# Patient Record
Sex: MALE | Race: WHITE | Employment: UNEMPLOYED | URBAN - METROPOLITAN AREA
[De-identification: names, ages, dates, MRNs, and addresses within clinical notes are randomized per-mention and may not be internally consistent; named-entity substitution may affect disease eponyms.]

---

## 2017-12-19 ENCOUNTER — APPOINTMENT (OUTPATIENT)
Dept: CT IMAGING | Facility: CLINIC | Age: 42
End: 2017-12-19
Attending: FAMILY MEDICINE
Payer: MEDICARE

## 2017-12-19 ENCOUNTER — HOSPITAL ENCOUNTER (EMERGENCY)
Facility: CLINIC | Age: 42
Discharge: HOME | End: 2017-12-19
Attending: FAMILY MEDICINE | Admitting: FAMILY MEDICINE
Payer: MEDICARE

## 2017-12-19 VITALS
RESPIRATION RATE: 16 BRPM | OXYGEN SATURATION: 95 % | SYSTOLIC BLOOD PRESSURE: 132 MMHG | DIASTOLIC BLOOD PRESSURE: 90 MMHG | TEMPERATURE: 98.5 F

## 2017-12-19 DIAGNOSIS — F41.0 ANXIETY ATTACK: ICD-10-CM

## 2017-12-19 DIAGNOSIS — R41.89 SPELL OF ALTERED COGNITION: ICD-10-CM

## 2017-12-19 LAB
ALBUMIN SERPL-MCNC: 3.8 G/DL (ref 3.4–5)
ALP SERPL-CCNC: 93 U/L (ref 40–150)
ALT SERPL W P-5'-P-CCNC: 18 U/L (ref 0–70)
ANION GAP SERPL CALCULATED.3IONS-SCNC: 8 MMOL/L (ref 3–14)
AST SERPL W P-5'-P-CCNC: 11 U/L (ref 0–45)
BASOPHILS # BLD AUTO: 0 10E9/L (ref 0–0.2)
BASOPHILS NFR BLD AUTO: 0.3 %
BILIRUB SERPL-MCNC: 0.4 MG/DL (ref 0.2–1.3)
BUN SERPL-MCNC: 13 MG/DL (ref 7–30)
CALCIUM SERPL-MCNC: 9.3 MG/DL (ref 8.5–10.1)
CHLORIDE SERPL-SCNC: 108 MMOL/L (ref 94–109)
CO2 SERPL-SCNC: 25 MMOL/L (ref 20–32)
CREAT SERPL-MCNC: 0.47 MG/DL (ref 0.66–1.25)
DIFFERENTIAL METHOD BLD: ABNORMAL
EOSINOPHIL # BLD AUTO: 0.1 10E9/L (ref 0–0.7)
EOSINOPHIL NFR BLD AUTO: 0.8 %
ERYTHROCYTE [DISTWIDTH] IN BLOOD BY AUTOMATED COUNT: 13.2 % (ref 10–15)
GFR SERPL CREATININE-BSD FRML MDRD: >90 ML/MIN/1.7M2
GLUCOSE SERPL-MCNC: 90 MG/DL (ref 70–99)
HCT VFR BLD AUTO: 39.3 % (ref 40–53)
HGB BLD-MCNC: 13 G/DL (ref 13.3–17.7)
IMM GRANULOCYTES # BLD: 0 10E9/L (ref 0–0.4)
IMM GRANULOCYTES NFR BLD: 0.1 %
LYMPHOCYTES # BLD AUTO: 0.9 10E9/L (ref 0.8–5.3)
LYMPHOCYTES NFR BLD AUTO: 11.8 %
MCH RBC QN AUTO: 26.6 PG (ref 26.5–33)
MCHC RBC AUTO-ENTMCNC: 33.1 G/DL (ref 31.5–36.5)
MCV RBC AUTO: 81 FL (ref 78–100)
MONOCYTES # BLD AUTO: 0.4 10E9/L (ref 0–1.3)
MONOCYTES NFR BLD AUTO: 4.4 %
NEUTROPHILS # BLD AUTO: 6.6 10E9/L (ref 1.6–8.3)
NEUTROPHILS NFR BLD AUTO: 82.6 %
NRBC # BLD AUTO: 0 10*3/UL
NRBC BLD AUTO-RTO: 0 /100
PLATELET # BLD AUTO: 268 10E9/L (ref 150–450)
POTASSIUM SERPL-SCNC: 3.7 MMOL/L (ref 3.4–5.3)
PROT SERPL-MCNC: 8.2 G/DL (ref 6.8–8.8)
RBC # BLD AUTO: 4.88 10E12/L (ref 4.4–5.9)
SODIUM SERPL-SCNC: 141 MMOL/L (ref 133–144)
TSH SERPL DL<=0.005 MIU/L-ACNC: 1.64 MU/L (ref 0.4–4)
WBC # BLD AUTO: 8 10E9/L (ref 4–11)

## 2017-12-19 PROCEDURE — 84443 ASSAY THYROID STIM HORMONE: CPT | Performed by: FAMILY MEDICINE

## 2017-12-19 PROCEDURE — 85025 COMPLETE CBC W/AUTO DIFF WBC: CPT | Performed by: FAMILY MEDICINE

## 2017-12-19 PROCEDURE — 80053 COMPREHEN METABOLIC PANEL: CPT | Performed by: FAMILY MEDICINE

## 2017-12-19 PROCEDURE — 93010 ELECTROCARDIOGRAM REPORT: CPT | Mod: Z6 | Performed by: FAMILY MEDICINE

## 2017-12-19 PROCEDURE — 93005 ELECTROCARDIOGRAM TRACING: CPT | Performed by: FAMILY MEDICINE

## 2017-12-19 PROCEDURE — 99285 EMERGENCY DEPT VISIT HI MDM: CPT | Mod: 25 | Performed by: FAMILY MEDICINE

## 2017-12-19 PROCEDURE — 99284 EMERGENCY DEPT VISIT MOD MDM: CPT | Mod: 25 | Performed by: FAMILY MEDICINE

## 2017-12-19 PROCEDURE — 70450 CT HEAD/BRAIN W/O DYE: CPT

## 2017-12-19 NOTE — DISCHARGE INSTRUCTIONS
Thank you for choosing [facility].     Please closely monitor for further symptoms. Return to the Emergency Department if you develop any new or worsening signs or symptoms.    If you received any opiate pain medications or sedatives during your visit, please do not drive for at least 8 hours.     Labs, cultures or final xray interpretations may still need to be reviewed.  We will call you if your plan of care needs to be changed.    Please follow up with your primary care physician or clinic.

## 2017-12-19 NOTE — ED NOTES
Bed: ED08  Expected date:   Expected time:   Means of arrival:   Comments:  Neel 635  42 yr. R/o seizure, confused

## 2017-12-19 NOTE — ED PROVIDER NOTES
"  History     Chief Complaint   Patient presents with     Altered Mental Status     increased confusion, poss. seizure at home     HPI  Oseas Carrillo is a 42 year old male transgender female to male patient, who presents due to a reported episode of altered mental status.  History is taken from the EMS paramedic who obtained some of the history from the patient's staff.  This patient has a history of mental health disorder and cognitive disability and has 24-hour staff at his home.  Per the report he seemed \"a little confused\" this morning.  At one point he was closing his eyes and would not talk to the staff.  He later apparently fell backward onto a couch and may have done some myoclonic jerking.  In speaking with the staff, they report that he was \"obsessing\" all night about some pending medical appointments.  He has a history of becoming extremely anxious when he has medical appointments.  At one point he was hyperventilating and this preceded his episode in which he fell back onto the couch.  No definite epileptiform activity.  He also seemed to be making some statements that did not make sense and saying words that did not go together.  That resolved completely after about 30 seconds.  The patient states he does not recall this event.  He currently has no symptoms.  Denies headache, denies any neurologic symptoms.  He states he believes he may have had a seizure due to his medications.  Medications have not been changed recently.  Per review of the chart from his most recent psychiatric admission the patient has a tendency to exhibit somatoform symptoms (\"The patient does reveal attention seeking, and has a tendency to focus on a multitude of somatic complaints when interacting with caregivers\").  He denies fever or chills.  Denies chest pain, palpitations, dizziness or lightheadedness, fever or chills.  Denies acute mental health symptoms to me.    I have reviewed the Medications, Allergies, Past Medical and " Surgical History, and Social History in the Epic system.    Review of Systems  All other systems were reviewed and are negative    Physical Exam   BP: 132/90  Heart Rate: 97  Temp: 98.5  F (36.9  C)  Resp: 16  SpO2: 95 %      Physical Exam   Constitutional: He is oriented to person, place, and time. He appears well-developed and well-nourished.   HENT:   Head: Normocephalic and atraumatic.   Mouth/Throat: Oropharynx is clear and moist.   Eyes: EOM are normal. Pupils are equal, round, and reactive to light.   Neck: Normal range of motion. Neck supple. No tracheal deviation present. No thyromegaly present.   Cardiovascular: Normal rate, regular rhythm, normal heart sounds and intact distal pulses.  Exam reveals no gallop and no friction rub.    No murmur heard.  Pulmonary/Chest: Effort normal and breath sounds normal. He exhibits no tenderness.   Abdominal: Soft. Bowel sounds are normal. He exhibits no distension and no mass. There is no tenderness.   Musculoskeletal: He exhibits no edema or tenderness.   Neurological: He is alert and oriented to person, place, and time. No cranial nerve deficit. Coordination normal.   Skin: Skin is warm and dry. No rash noted.   Psychiatric: He has a normal mood and affect. His behavior is normal.   Nursing note and vitals reviewed.      ED Course     ED Course     Procedures             EKG Interpretation:      Interpreted by Tyrone Grimm  Time reviewed: 0914  Symptoms at time of EKG: spell   Rhythm: normal sinus   Rate: Normal  Axis: Normal  Ectopy: none  Conduction: normal  ST Segments/ T Waves: No ST-T wave changes and No acute ischemic changes  Q Waves: v1 and III  Comparison to prior: Unchanged from 5/2015    Clinical Impression: Normal sinus rhythm with septal Q waves.  No acute changes and no acute changes                  Critical Care time:  none           Labs Ordered and Resulted from Time of ED Arrival Up to the Time of Departure from the ED   CBC WITH PLATELETS  DIFFERENTIAL - Abnormal; Notable for the following:        Result Value    Hemoglobin 13.0 (*)     Hematocrit 39.3 (*)     All other components within normal limits   COMPREHENSIVE METABOLIC PANEL - Abnormal; Notable for the following:     Creatinine 0.47 (*)     All other components within normal limits   TSH WITH FREE T4 REFLEX   DRUG ABUSE SCREEN 6 CHEM DEP URINE ([facility])   ROUTINE UA WITH MICROSCOPIC REFLEX TO CULTURE            Assessments & Plan (with Medical Decision Making)   Patient with extensive health history presents after an episode of altered cognition this morning, in context of severe anxiety and obsessing about impending medical appointments.  Differential diagnosis includes seizure, TIA or other acute vascular neurologic event, toxic ingestion, metabolic encephalopathy, syncopal event, meningitis or encephalitis, exacerbation of somatoform anxiety.  Patient denies any headache or neurologic symptoms, has normal vitals, normal mental status (baseline cognitive impairment is evident), normal funduscopic exam, supple neck, normal cardiovascular exam, normal neurologic exam.  Extensive diagnostic workup including imaging with head CT and labs is unremarkable.  The patient remained stable in all aspects during the period of time he was in the emergency department.  I see no indication for further diagnostic testing, consultation, or inpatient hospital admission at this time, and in speaking with the staff from his home setting, this episode certainly sounds most consistent with some type of reaction due to anxiety.  At this time the patient appears stable to proceed as an outpatient to follow-up for scheduled medical appointments and to be observed closely by the staff.  Discussed expected course, need for follow up, and indications for return with the patient.  See discharge instructions.    I have reviewed the nursing notes.    I have reviewed the findings, diagnosis, plan and need for follow up with  the patient.    New Prescriptions    No medications on file       Final diagnoses:   Spell of altered cognition   Anxiety attack       12/19/2017   [facility], [place], EMERGENCY DEPARTMENT     Tyrone Grimm MD  12/19/17 3038

## 2017-12-19 NOTE — ED AVS SNAPSHOT
[facility], [place], Emergency Department    [address]    [facility] [address]    Phone:  [phone]    Fax:  [phone]                                       Oseas Carrillo   MRN: [id number]    Department:  [facility], Emergency Department   Date of Visit:  12/19/2017           After Visit Summary Signature Page     I have received my discharge instructions, and my questions have been answered. I have discussed any challenges I see with this plan with the nurse or doctor.    ..........................................................................................................................................  Patient/Patient Representative Signature      ..........................................................................................................................................  Patient Representative Print Name and Relationship to Patient    ..................................................               ................................................  Date                                            Time    ..........................................................................................................................................  Reviewed by Signature/Title    ...................................................              ..............................................  Date                                                            Time

## 2017-12-19 NOTE — ED AVS SNAPSHOT
[facility], Emergency Department    [address]    [place] [address]    Phone:  [phone]    Fax:  [phone]                                       Oseas Carrillo   MRN: [id number]    Department:  [facility], Emergency Department   Date of Visit:  12/19/2017           Patient Information     Date Of Birth          1975        Your diagnoses for this visit were:     Spell of altered cognition     Anxiety attack        You were seen by Tyrone Grimm MD.        Discharge Instructions       Thank you for choosing [facility].     Please closely monitor for further symptoms. Return to the Emergency Department if you develop any new or worsening signs or symptoms.    If you received any opiate pain medications or sedatives during your visit, please do not drive for at least 8 hours.     Labs, cultures or final xray interpretations may still need to be reviewed.  We will call you if your plan of care needs to be changed.    Please follow up with your primary care physician or clinic.      Discharge References/Attachments     ANXIETY REACTION (ENGLISH)    SYMPTOMS WITH UNCERTAIN CAUSE (ENGLISH)      24 Hour Appointment Hotline       To make an appointment at any [facility] clinic, call [phone] ([phone]). If you don't have a family doctor or clinic, we will help you find one. [facility] clinics are conveniently located to serve the needs of you and your family.             Review of your medicines      Our records show that you are taking the medicines listed below. If these are incorrect, please call your family doctor or clinic.        Dose / Directions Last dose taken    acetaminophen 325 MG tablet   Commonly known as:  TYLENOL   Dose:  975 mg   Quantity:  100 tablet        Take 3 tablets (975 mg) by mouth every 6 hours as needed for mild pain or fever   Refills:  1        albuterol 108 (90 BASE) MCG/ACT Inhaler   Commonly known as:  PROAIR HFA/PROVENTIL  HFA/VENTOLIN HFA   Dose:  2 puff   Quantity:  1 Inhaler        Inhale 2 puffs into the lungs every 4 hours as needed for wheezing or shortness of breath / dyspnea   Refills:  2        amLODIPine 5 MG tablet   Commonly known as:  NORVASC   Dose:  5 mg   Quantity:  30 tablet        Take 1 tablet (5 mg) by mouth daily   Refills:  1        atomoxetine 60 MG capsule   Commonly known as:  STRATTERA   Dose:  60 mg   Quantity:  30 capsule        Take 1 capsule (60 mg) by mouth daily   Refills:  1        calcium carbonate 500 MG chewable tablet   Commonly known as:  TUMS   Dose:  1 chew tab        Take 1 chew tab by mouth daily as needed for heartburn   Refills:  0        clobetasol 0.05 % ointment   Commonly known as:  TEMOVATE   Quantity:  30 g        Apply topically 2 times daily Apply to all red papules on the body (not to face or skin folds). Apply for 3 weeks, then one week off, can repeat this cycle.   Refills:  1        desonide 0.05 % ointment   Commonly known as:  DESOWEN   Quantity:  30 g        Apply topically daily Apply to the eyelids daily   Refills:  1        diazepam 2 MG tablet   Commonly known as:  VALIUM   Dose:  5 mg   Quantity:  60 tablet        Take 2.5 tablets (5 mg) by mouth every 4 hours as needed for anxiety   Refills:  1        diphenhydrAMINE HCl 50 MG Tabs   Dose:   mg   Indication:  Itching   Quantity:  60 tablet        Take  mg by mouth 3 times daily as needed   Refills:  1        fluticasone 50 MCG/ACT spray   Commonly known as:  FLONASE   Dose:  1 spray   Quantity:  16 g        Spray 1 spray into both nostrils daily   Refills:  1        guanFACINE 1 MG tablet   Commonly known as:  TENEX   Dose:  1 mg   Quantity:  30 tablet        Take 1 tablet (1 mg) by mouth At Bedtime   Refills:  1        hydrOXYzine 50 MG tablet   Commonly known as:  ATARAX   Dose:   mg   Quantity:  120 tablet        Take 1-2 tablets ( mg) by mouth every 4 hours as needed for anxiety   Refills:  1         IBUPROFEN PO   Dose:  800 mg        Take 800 mg by mouth every 6 hours as needed for moderate pain   Refills:  0        lactase 3000 UNIT tablet   Commonly known as:  LACTASE ENZYME   Dose:  3000 Units   Quantity:  90 tablet        Take 1 tablet (3,000 Units) by mouth 3 times daily as needed for indigestion   Refills:  1        lamoTRIgine 100 MG tablet   Commonly known as:  LaMICtal   Dose:  100 mg   Indication:  Manic-Depression   Quantity:  30 tablet        Take 1 tablet (100 mg) by mouth daily   Refills:  1        levothyroxine 100 MCG tablet   Commonly known as:  SYNTHROID/LEVOTHROID   Dose:  100 mcg   Quantity:  30 tablet        Take 1 tablet (100 mcg) by mouth daily   Refills:  1        medroxyPROGESTERone 150 MG/ML injection   Commonly known as:  DEPO-PROVERA   Dose:  150 mg   Quantity:  0.9 mL        Inject 1 mL (150 mg) into the muscle every 3 months   Refills:  1        Melatonin 10 MG Tabs tablet   Dose:  10 mg   Quantity:  30 tablet        Take 1 tablet (10 mg) by mouth At Bedtime   Refills:  1        miconazole 2 % powder   Commonly known as:  MICATIN; MICRO GUARD   Quantity:  30 g        Apply topically 3 times daily Apply to rash under breasts and pannus.   Refills:  1        naphazoline-pheniramine Soln ophthalmic solution   Commonly known as:  OPCON-A/VISINE A/NAPHCON A   Dose:  1 drop        Apply 1 drop to eye 4 times daily as needed   Refills:  0        OLANZapine 15 MG tablet   Commonly known as:  zyPREXA   Dose:  15 mg   Quantity:  30 tablet        Take 1 tablet (15 mg) by mouth At Bedtime   Refills:  1        omeprazole 20 MG tablet   Dose:  20 mg   Quantity:  60 tablet        Take 1 tablet (20 mg) by mouth 2 times daily Take 30-60 minutes before a meal.   Refills:  1        ranitidine 150 MG tablet   Commonly known as:  ZANTAC   Dose:  150 mg   Quantity:  60 tablet        Take 1 tablet (150 mg) by mouth 2 times daily   Refills:  1        SIMETHICONE-80 PO   Dose:  80 mg        Take 80  "mg by mouth every 6 hours as needed for intestinal gas   Refills:  0                Procedures and tests performed during your visit     CBC with platelets differential    Comprehensive metabolic panel    EKG 12 lead    Head CT w/o contrast    TSH with free T4 reflex      Orders Needing Specimen Collection     Ordered          12/19/17 0849  Drug abuse screen 6 urine (tox) - STAT, [facility]: STAT, Needs to be Collected     Scheduled Task Status   12/19/17 0849 Collect Drug abuse screen 6 urine (tox) Open   Order Class:  PCU Collect                12/19/17 0849  UA with Microscopic reflex to Culture - STAT, [facility]: STAT, Needs to be Collected     Scheduled Task Status   12/19/17 0849 Collect UA with Microscopic reflex to Culture Open   Order Class:  PCU Collect                  Pending Results     No orders found from 12/17/2017 to 12/20/2017.            Pending Culture Results     No orders found from 12/17/2017 to 12/20/2017.            Pending Results Instructions     If you had any lab results that were not finalized at the time of your Discharge, you can call the ED Lab Result RN at [phone]. You will be contacted by this team for any positive Lab results or changes in treatment. The nurses are available 7 days a week from 10A to 6:30P.  You can leave a message 24 hours per day and they will return your call.        Thank you for choosing [facility]       Thank you for choosing [facility] for your care. Our goal is always to provide you with excellent care. Hearing back from our patients is one way we can continue to improve our services. Please take a few minutes to complete the written survey that you may receive in the mail after you visit with us. Thank you!        [facility] Information     [employer] lets you send messages to your doctor, view your test results, renew your prescriptions, schedule appointments and more. To sign up, go to [email or web address] . Click on \"Log in\" on the left side of the screen, which " "will take you to the Welcome page. Then click on \"Sign up Now\" on the right side of the page.     You will be asked to enter the access code listed below, as well as some personal information. Please follow the directions to create your username and password.     Your access code is: [email or web address]  Expires: 3/19/2018 11:14 AM     Your access code will  in 90 days. If you need help or a new code, please call your [facility] clinic or [phone].        Care EveryWhere ID     This is your Care EveryWhere ID. This could be used by other organizations to access your [facility] medical records  [id number]        Equal Access to Services     DAVI STRANGE : Milton Lock, ritika quesada, boston duarte, rosendo monge. So [facility] [phone].    ATENCIÓN: Si habla español, tiene a villegas disposición servicios gratuitos de asistencia lingüística. Llame al [phone].    We comply with applicable federal civil rights laws and [place] laws. We do not discriminate on the basis of race, color, national origin, age, disability, sex, sexual orientation, or gender identity.            After Visit Summary       This is your record. Keep this with you and show to your community pharmacist(s) and doctor(s) at your next visit.                  "

## 2017-12-20 LAB — INTERPRETATION ECG - MUSE: NORMAL
